# Patient Record
Sex: FEMALE | Race: WHITE | Employment: OTHER | URBAN - METROPOLITAN AREA
[De-identification: names, ages, dates, MRNs, and addresses within clinical notes are randomized per-mention and may not be internally consistent; named-entity substitution may affect disease eponyms.]

---

## 2018-11-13 ENCOUNTER — TELEPHONE (OUTPATIENT)
Dept: ORTHOPEDIC SURGERY | Age: 63
End: 2018-11-13

## 2018-12-07 ENCOUNTER — OFFICE VISIT (OUTPATIENT)
Dept: ORTHOPEDIC SURGERY | Age: 63
End: 2018-12-07
Payer: COMMERCIAL

## 2018-12-07 VITALS
BODY MASS INDEX: 45.16 KG/M2 | HEART RATE: 88 BPM | WEIGHT: 230 LBS | SYSTOLIC BLOOD PRESSURE: 179 MMHG | HEIGHT: 60 IN | DIASTOLIC BLOOD PRESSURE: 87 MMHG | TEMPERATURE: 97.8 F

## 2018-12-07 DIAGNOSIS — M25.511 ACUTE PAIN OF RIGHT SHOULDER: Primary | ICD-10-CM

## 2018-12-07 PROCEDURE — 99203 OFFICE O/P NEW LOW 30 MIN: CPT | Performed by: ORTHOPAEDIC SURGERY

## 2018-12-07 RX ORDER — MEDROXYPROGESTERONE ACETATE 10 MG/1
TABLET ORAL
Refills: 0 | COMMUNITY
Start: 2018-09-29 | End: 2019-12-13

## 2018-12-07 RX ORDER — IBUPROFEN 800 MG/1
800 TABLET ORAL EVERY 6 HOURS PRN
Qty: 40 TABLET | Refills: 2 | Status: SHIPPED | OUTPATIENT
Start: 2018-12-07 | End: 2019-12-13

## 2018-12-07 NOTE — PROGRESS NOTES
tablet, Take 25 mg by mouth daily. , Disp: , Rfl:     medroxyPROGESTERone (PROVERA) 10 MG tablet, TAKE 1 TABLET BY MOUTH ONCE A DAY ON DAYS 1-10 EVERY 3RD MONTH, Disp: , Rfl: 0    ALLERGIES  Allergies   Allergen Reactions    Pcn [Penicillins] Hives       Controlled Substances Monitoring:        REVIEW OF SYSTEMS:     Constitutional:  Negative for weight loss, fevers, chills, fatigue  Cardiovascular: Negative for chest pain, palpitations  Pulmonary: Negative for shortness of breath, labored breathing, cough  GI: negative for abdominal pain, nausea, vomitting   MSK: per HPI  Skin: negative for rash, open wounds    All other systems reviewed and are negative           PHYSICAL EXAM     Vitals:    12/07/18 0754   BP: (!) 179/87   Site: Right Upper Arm   Position: Sitting   Pulse: 88   Temp: 97.8 °F (36.6 °C)   TempSrc: Oral   Weight: 230 lb (104.3 kg)   Height: 5' (1.524 m)       Height: 5' (1.524 m)  Weight: 230 lb BMI:  Body mass index is 44.92 kg/m². General: The patient is alert and oriented x 3, appears to be stated age and in no distress. HEENT: head is normocephalic, atraumatic. EOMI. Neck: supple, trachea midline, no thyromegaly   Cardiovascular: peripheral pulses palpable. Normal Capillary refill   Respiratory: breathing unlabored, chest expansion symmetric   Skin: no rash, no open wounds, no erythema  Psych: normal affect; mood stable  Neurologic: gait normal, sensation grossly intact in extremities  MSK:    Cervical Spine: There is no tenderness to palpation along the cervical spine. Range of motion is normal.  Spurling's is negative    Shoulder Exam:   On exam of the right upper extremity, there is no pain or tenderness around the shoulder joint and shoulder exam is normal.  There is tenderness and some swelling over the right side sternoclavicular joint with some mild prominence of the bone. There is some crepitus with shoulder range of motion in this area.   There is no obvious fluid

## 2019-01-11 ENCOUNTER — OFFICE VISIT (OUTPATIENT)
Dept: ORTHOPEDIC SURGERY | Age: 64
End: 2019-01-11
Payer: COMMERCIAL

## 2019-01-11 VITALS
WEIGHT: 230 LBS | TEMPERATURE: 98.2 F | SYSTOLIC BLOOD PRESSURE: 152 MMHG | BODY MASS INDEX: 45.16 KG/M2 | HEART RATE: 65 BPM | DIASTOLIC BLOOD PRESSURE: 71 MMHG | HEIGHT: 60 IN

## 2019-01-11 DIAGNOSIS — M25.511 ACUTE PAIN OF RIGHT SHOULDER: Primary | ICD-10-CM

## 2019-01-11 PROCEDURE — 99213 OFFICE O/P EST LOW 20 MIN: CPT | Performed by: ORTHOPAEDIC SURGERY

## 2019-12-13 ENCOUNTER — HOSPITAL ENCOUNTER (OUTPATIENT)
Dept: CT IMAGING | Age: 64
Discharge: HOME | End: 2019-12-15
Payer: MEDICARE

## 2019-12-13 ENCOUNTER — HOSPITAL ENCOUNTER (OUTPATIENT)
Age: 64
Discharge: HOME | End: 2019-12-13
Payer: MEDICARE

## 2019-12-13 ENCOUNTER — HOSPITAL ENCOUNTER (OUTPATIENT)
Dept: GENERAL RADIOLOGY | Age: 64
Discharge: HOME | End: 2019-12-15
Payer: MEDICARE

## 2019-12-13 VITALS
RESPIRATION RATE: 18 BRPM | WEIGHT: 260 LBS | DIASTOLIC BLOOD PRESSURE: 65 MMHG | BODY MASS INDEX: 51.04 KG/M2 | HEART RATE: 76 BPM | OXYGEN SATURATION: 95 % | TEMPERATURE: 98 F | SYSTOLIC BLOOD PRESSURE: 134 MMHG | HEIGHT: 60 IN

## 2019-12-13 DIAGNOSIS — M43.26 ANKYLOSIS OF LUMBAR SPINE: ICD-10-CM

## 2019-12-13 DIAGNOSIS — R52 PAIN: ICD-10-CM

## 2019-12-13 LAB
INR BLD: 0.9
PLATELET # BLD: 310 E9/L (ref 130–450)
PROTHROMBIN TIME: 10.9 SEC (ref 9.3–12.4)

## 2019-12-13 PROCEDURE — 85049 AUTOMATED PLATELET COUNT: CPT

## 2019-12-13 PROCEDURE — 85610 PROTHROMBIN TIME: CPT

## 2019-12-13 PROCEDURE — 7100000010 HC PHASE II RECOVERY - FIRST 15 MIN

## 2019-12-13 PROCEDURE — 62304 MYELOGRAPHY LUMBAR INJECTION: CPT

## 2019-12-13 PROCEDURE — 72132 CT LUMBAR SPINE W/DYE: CPT

## 2019-12-13 PROCEDURE — 7100000011 HC PHASE II RECOVERY - ADDTL 15 MIN

## 2019-12-13 PROCEDURE — 6360000004 HC RX CONTRAST MEDICATION: Performed by: RADIOLOGY

## 2019-12-13 PROCEDURE — 36415 COLL VENOUS BLD VENIPUNCTURE: CPT

## 2019-12-13 RX ORDER — LIDOCAINE HYDROCHLORIDE 10 MG/ML
5 INJECTION, SOLUTION INFILTRATION; PERINEURAL ONCE
Status: DISCONTINUED | OUTPATIENT
Start: 2019-12-13 | End: 2019-12-16 | Stop reason: HOSPADM

## 2019-12-13 RX ORDER — MULTIVIT-MIN/IRON/FOLIC ACID/K 18-600-40
CAPSULE ORAL
COMMUNITY

## 2019-12-13 RX ADMIN — IOPAMIDOL 13 ML: 408 INJECTION, SOLUTION INTRATHECAL at 08:40

## 2019-12-13 ASSESSMENT — PAIN DESCRIPTION - ORIENTATION
ORIENTATION: MID

## 2019-12-13 ASSESSMENT — PAIN DESCRIPTION - PAIN TYPE
TYPE: CHRONIC PAIN

## 2019-12-13 ASSESSMENT — PAIN SCALES - GENERAL
PAINLEVEL_OUTOF10: 4

## 2019-12-13 ASSESSMENT — PAIN DESCRIPTION - DESCRIPTORS
DESCRIPTORS: DISCOMFORT
DESCRIPTORS: DISCOMFORT

## 2019-12-13 ASSESSMENT — PAIN DESCRIPTION - FREQUENCY
FREQUENCY: CONTINUOUS

## 2019-12-13 ASSESSMENT — PAIN - FUNCTIONAL ASSESSMENT: PAIN_FUNCTIONAL_ASSESSMENT: 0-10

## 2019-12-13 ASSESSMENT — PAIN DESCRIPTION - LOCATION
LOCATION: BACK

## 2021-02-20 ENCOUNTER — IMMUNIZATION (OUTPATIENT)
Dept: PRIMARY CARE CLINIC | Age: 66
End: 2021-02-20
Payer: MEDICARE

## 2021-02-20 PROCEDURE — 91300 COVID-19, PFIZER VACCINE 30MCG/0.3ML DOSE: CPT | Performed by: FAMILY MEDICINE

## 2021-02-20 PROCEDURE — 0001A COVID-19, PFIZER VACCINE 30MCG/0.3ML DOSE: CPT | Performed by: FAMILY MEDICINE

## 2021-03-16 ENCOUNTER — IMMUNIZATION (OUTPATIENT)
Dept: PRIMARY CARE CLINIC | Age: 66
End: 2021-03-16
Payer: MEDICARE

## 2021-03-16 PROCEDURE — 91300 COVID-19, PFIZER VACCINE 30MCG/0.3ML DOSE: CPT | Performed by: PHYSICIAN ASSISTANT

## 2021-03-16 PROCEDURE — 0002A COVID-19, PFIZER VACCINE 30MCG/0.3ML DOSE: CPT | Performed by: PHYSICIAN ASSISTANT

## 2021-12-17 ENCOUNTER — HOSPITAL ENCOUNTER (OUTPATIENT)
Age: 66
Discharge: HOME | End: 2021-12-19

## 2021-12-17 PROCEDURE — 88305 TISSUE EXAM BY PATHOLOGIST: CPT

## 2023-03-07 ENCOUNTER — HOSPITAL ENCOUNTER (OUTPATIENT)
Age: 68
Discharge: HOME | End: 2023-03-09

## 2023-10-20 ENCOUNTER — HOSPITAL ENCOUNTER (EMERGENCY)
Age: 68
Discharge: HOME | End: 2023-10-20
Attending: EMERGENCY MEDICINE
Payer: MEDICARE

## 2023-10-20 VITALS
WEIGHT: 288.8 LBS | BODY MASS INDEX: 56.7 KG/M2 | DIASTOLIC BLOOD PRESSURE: 69 MMHG | TEMPERATURE: 97.9 F | OXYGEN SATURATION: 97 % | SYSTOLIC BLOOD PRESSURE: 129 MMHG | RESPIRATION RATE: 18 BRPM | HEIGHT: 60 IN | HEART RATE: 82 BPM

## 2023-10-20 DIAGNOSIS — T78.40XA ALLERGIC REACTION, INITIAL ENCOUNTER: Primary | ICD-10-CM

## 2023-10-20 PROCEDURE — 99283 EMERGENCY DEPT VISIT LOW MDM: CPT

## 2023-10-20 PROCEDURE — 6360000002 HC RX W HCPCS

## 2023-10-20 PROCEDURE — 6370000000 HC RX 637 (ALT 250 FOR IP)

## 2023-10-20 RX ORDER — DIPHENHYDRAMINE HCL 25 MG
50 TABLET ORAL ONCE
Status: COMPLETED | OUTPATIENT
Start: 2023-10-20 | End: 2023-10-20

## 2023-10-20 RX ORDER — DEXAMETHASONE SODIUM PHOSPHATE 10 MG/ML
10 INJECTION INTRAMUSCULAR; INTRAVENOUS ONCE
Status: COMPLETED | OUTPATIENT
Start: 2023-10-20 | End: 2023-10-20

## 2023-10-20 RX ADMIN — DIPHENHYDRAMINE HCL 50 MG: 25 TABLET ORAL at 07:44

## 2023-10-20 RX ADMIN — DEXAMETHASONE SODIUM PHOSPHATE 10 MG: 10 INJECTION INTRAMUSCULAR; INTRAVENOUS at 07:45

## 2023-10-20 ASSESSMENT — LIFESTYLE VARIABLES
HOW MANY STANDARD DRINKS CONTAINING ALCOHOL DO YOU HAVE ON A TYPICAL DAY: PATIENT DOES NOT DRINK
HOW OFTEN DO YOU HAVE A DRINK CONTAINING ALCOHOL: NEVER

## 2023-10-20 NOTE — ED NOTES
Patient presents to ED via private vehicle from home for left sided facial swelling, patient denies any respiratory problems, denies oral swelling, chest pain, or SOB. Patient ambulates without difficulty and is a&ox4. Patient reports the swelling began after starting a new ointment on Monday.      Ele Perez RN  10/20/23 [id number]

## 2023-10-20 NOTE — DISCHARGE INSTRUCTIONS
Stop using the ketoconazole cream and discussed with primary care physician. Take Benadryl as needed at home for the itching.

## 2023-10-20 NOTE — ED NOTES
Discharge instructions given. Patient verbalizes understanding. No other noted or stated problems at this time. Patient will follow up with primary care.       Jorge Roberts RN  10/20/23 9786